# Patient Record
Sex: MALE | Race: WHITE | Employment: UNEMPLOYED | URBAN - METROPOLITAN AREA
[De-identification: names, ages, dates, MRNs, and addresses within clinical notes are randomized per-mention and may not be internally consistent; named-entity substitution may affect disease eponyms.]

---
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
## 2024-12-09 ENCOUNTER — TRANSCRIBE ORDERS (OUTPATIENT)
Dept: OTHER | Age: 24
End: 2024-12-09

## 2024-12-09 DIAGNOSIS — Z98.890 S/P MEDIAL MENISCUS REPAIR OF RIGHT KNEE: Primary | ICD-10-CM

## 2024-12-30 ENCOUNTER — THERAPY VISIT (OUTPATIENT)
Dept: PHYSICAL THERAPY | Facility: CLINIC | Age: 24
End: 2024-12-30
Attending: ORTHOPAEDIC SURGERY
Payer: OTHER MISCELLANEOUS

## 2024-12-30 DIAGNOSIS — Z98.890 S/P MEDIAL MENISCUS REPAIR OF LEFT KNEE: Primary | ICD-10-CM

## 2024-12-30 PROCEDURE — 97161 PT EVAL LOW COMPLEX 20 MIN: CPT | Mod: GP | Performed by: PHYSICAL THERAPIST

## 2024-12-30 PROCEDURE — 97110 THERAPEUTIC EXERCISES: CPT | Mod: GP | Performed by: PHYSICAL THERAPIST

## 2024-12-30 ASSESSMENT — ACTIVITIES OF DAILY LIVING (ADL)
SIT WITH YOUR KNEE BENT: ACTIVITY IS SOMEWHAT DIFFICULT
WALK: ACTIVITY IS MINIMALLY DIFFICULT
AS_A_RESULT_OF_YOUR_KNEE_INJURY,_HOW_WOULD_YOU_RATE_YOUR_CURRENT_LEVEL_OF_DAILY_ACTIVITY?: NEARLY NORMAL
WEAKNESS: THE SYMPTOM AFFECTS MY ACTIVITY SLIGHTLY
KNEE_ACTIVITY_OF_DAILY_LIVING_SUM: 39
STIFFNESS: THE SYMPTOM AFFECTS MY ACTIVITY SLIGHTLY
STAND: ACTIVITY IS MINIMALLY DIFFICULT
LIMPING: THE SYMPTOM AFFECTS MY ACTIVITY SLIGHTLY
KNEEL ON THE FRONT OF YOUR KNEE: ACTIVITY IS VERY DIFFICULT
GO DOWN STAIRS: ACTIVITY IS FAIRLY DIFFICULT
GO UP STAIRS: ACTIVITY IS MINIMALLY DIFFICULT
STAND: ACTIVITY IS MINIMALLY DIFFICULT
HOW_WOULD_YOU_RATE_THE_OVERALL_FUNCTION_OF_YOUR_KNEE_DURING_YOUR_USUAL_DAILY_ACTIVITIES?: ABNORMAL
HOW_WOULD_YOU_RATE_THE_OVERALL_FUNCTION_OF_YOUR_KNEE_DURING_YOUR_USUAL_DAILY_ACTIVITIES?: ABNORMAL
AS_A_RESULT_OF_YOUR_KNEE_INJURY,_HOW_WOULD_YOU_RATE_YOUR_CURRENT_LEVEL_OF_DAILY_ACTIVITY?: NEARLY NORMAL
GIVING WAY, BUCKLING OR SHIFTING OF KNEE: THE SYMPTOM AFFECTS MY ACTIVITY MODERATELY
PLEASE_INDICATE_YOR_PRIMARY_REASON_FOR_REFERRAL_TO_THERAPY:: KNEE
PAIN: THE SYMPTOM AFFECTS MY ACTIVITY SLIGHTLY
GIVING WAY, BUCKLING OR SHIFTING OF KNEE: THE SYMPTOM AFFECTS MY ACTIVITY MODERATELY
WALK: ACTIVITY IS MINIMALLY DIFFICULT
RISE FROM A CHAIR: ACTIVITY IS MINIMALLY DIFFICULT
STIFFNESS: THE SYMPTOM AFFECTS MY ACTIVITY SLIGHTLY
GO UP STAIRS: ACTIVITY IS MINIMALLY DIFFICULT
SQUAT: I AM UNABLE TO DO THE ACTIVITY
SWELLING: THE SYMPTOM AFFECTS MY ACTIVITY SLIGHTLY
WEAKNESS: THE SYMPTOM AFFECTS MY ACTIVITY SLIGHTLY
LIMPING: THE SYMPTOM AFFECTS MY ACTIVITY SLIGHTLY
HOW_WOULD_YOU_RATE_THE_CURRENT_FUNCTION_OF_YOUR_KNEE_DURING_YOUR_USUAL_DAILY_ACTIVITIES_ON_A_SCALE_FROM_0_TO_100_WITH_100_BEING_YOUR_LEVEL_OF_KNEE_FUNCTION_PRIOR_TO_YOUR_INJURY_AND_0_BEING_THE_INABILITY_TO_PERFORM_ANY_OF_YOUR_USUAL_DAILY_ACTIVITIES?: 70
SQUAT: I AM UNABLE TO DO THE ACTIVITY
KNEE_ACTIVITY_OF_DAILY_LIVING_SCORE: 55.71
SIT WITH YOUR KNEE BENT: ACTIVITY IS SOMEWHAT DIFFICULT
GO DOWN STAIRS: ACTIVITY IS FAIRLY DIFFICULT
RAW_SCORE: 39
RISE FROM A CHAIR: ACTIVITY IS MINIMALLY DIFFICULT
PAIN: THE SYMPTOM AFFECTS MY ACTIVITY SLIGHTLY
KNEEL ON THE FRONT OF YOUR KNEE: ACTIVITY IS VERY DIFFICULT
SWELLING: THE SYMPTOM AFFECTS MY ACTIVITY SLIGHTLY
HOW_WOULD_YOU_RATE_THE_CURRENT_FUNCTION_OF_YOUR_KNEE_DURING_YOUR_USUAL_DAILY_ACTIVITIES_ON_A_SCALE_FROM_0_TO_100_WITH_100_BEING_YOUR_LEVEL_OF_KNEE_FUNCTION_PRIOR_TO_YOUR_INJURY_AND_0_BEING_THE_INABILITY_TO_PERFORM_ANY_OF_YOUR_USUAL_DAILY_ACTIVITIES?: 70

## 2024-12-30 NOTE — PROGRESS NOTES
"PHYSICAL THERAPY EVALUATION  Type of Visit: Evaluation       Fall Risk Screen:  Fall screen completed by: PT  Have you fallen 2 or more times in the past year?: No  Have you fallen and had an injury in the past year?: No  Is patient a fall risk?: No  Fall screen comments: skiing falls - without injury    Subjective         Presenting condition or subjective complaint: (Patient-Rptd) torn menicus surgery referred to PT following L meniscal repair.  Onset: was at work and hurried down steep steps hyperextending his knee when he reached the bottom of the stair case. Had ongoing pain for a few weeks and then had MRI showing meniscal tear in 2 places per pt. EXERCISES: Heel slides with 90 degree restriction initially. Reports after last provider meeting he was able to work through \"free range\".   FWB, No brace or crutches at this time.  NWB x 2 months. Forgot to take brace off in Dec as instructed and notes knee feels tight.   Date of onset: 10/07/24 (date of surgery)    Relevant medical history: (Patient-Rptd) Depression; Mental Illness; Sleep disorder like apnea   Dates & types of surgery: (Patient-Rptd) 10/7 had torn menicus surgury    Prior diagnostic imaging/testing results: (Patient-Rptd) MRI; CT scan; X-ray   Per [facility] MR of L knee report:   LEFT KNEE IMPRESSION:   1.  Horizontal longitudinal tear of the left knee medial meniscus   posterior horn extending to the posterior root where there is an   associated 11 mm parameniscal cyst.   2.  No lateral meniscal tear. The cruciate and collateral ligaments   are intact.   3.  No fracture or bony stress injury   Prior therapy history for the same diagnosis, illness or injury: (Patient-Rptd) No      Prior Level of Function  Transfers: Independent  Ambulation: Independent  ADL: Independent  IADL: Finances, Work    Living Environment  Social support: (Patient-Rptd) With family members   Type of home: (Patient-Rptd) House   Stairs to enter the home: (Patient-Rptd) " "Yes       Ramp: (Patient-Rptd) No   Stairs inside the home: (Patient-Rptd) Yes (Patient-Rptd) 3 Is there a railing: (Patient-Rptd) Yes     Help at home: (Patient-Rptd) Self Cares (home health aide/personal care attendant, family, etc)  Equipment owned: (Patient-Rptd) Crutches     Employment: (Patient-Rptd) Yes (Patient-Rptd) [employer]  Hobbies/Interests: (Patient-Rptd) skiing,being outside,being with friends and family    Patient goals for therapy: (Patient-Rptd) walk, climb stairs up or down, skiing, running, lifting/squating    Pain assessment:   No pain, 4-5/10  Walking unless moving knee a \"certain way\", eg knee full extension - sharp. Laying down: R sidelying, sitting x approx 3 hours will get pain with leg movement, rising: weak. Has to be able to lift, squat, would like to get back to downhill skiing.   Knee feels swollen  Numbness: from arch to toes L, improving and occasionally sharp pinch in arch area and around toes     Objective   KNEE EVALUATION    INTEGUMENTARY (edema, incisions):  well healed  POSTURE: Kyphotic in sitting  GAIT:  Weightbearing Status: WBAT  Assistive Device(s): None  Gait Deviations: Antalgic - keeps L knee staight  ROM:   (Degrees) Left AROM Left PROM  Right AROM Right PROM   Knee Flexion 90  131    Knee Extension 7-0  +2    Pain:   End feel:     STRENGTH:   Pain: - none + mild ++ moderate +++ severe  Strength Scale: 0-5/5 Left Right   Knee Flexion 3+ 4   Knee Extension 3+ 4   Quad Set 3- 4     PALPATION: Hypersensitivity along knee joint/incisions L knee    Assessment & Plan   CLINICAL IMPRESSIONS  Medical Diagnosis: S/P medial meniscus repair of right knee (Z98.890)  - Primary    Treatment Diagnosis: S/P medial meniscus repair of right knee (Z98.890)  - Primary   Impression/Assessment: Patient is a 24 year old male with gait level and steps, bending , general activities such as squatting and kneeling, ability to work/recreation complaints.  The following significant findings " have been identified: Pain, Decreased ROM/flexibility, Decreased strength, Impaired balance, Decreased proprioception, Impaired gait, Decreased activity tolerance, and Impaired posture. These impairments interfere with their ability to perform self care tasks, work tasks, recreational activities, household chores, household mobility, and community mobility as compared to previous level of function.     Clinical Decision Making (Complexity):  Clinical Presentation: Stable/Uncomplicated  Clinical Presentation Rationale: based on medical and personal factors listed in PT evaluation  Clinical Decision Making (Complexity): Low complexity    PLAN OF CARE  Treatment Interventions:  Interventions: Manual Therapy, Neuromuscular Re-education, Therapeutic Activity, Therapeutic Exercise, Self-Care/Home Management    Long Term Goals     PT Goal 1  Goal Identifier: AROM  Goal Description: Neri will work on home program to increase L knee ROM to equal that of the R  Rationale: to maximize safety and independence with performance of ADLs and functional tasks;to maximize safety and independence within the home;to maximize safety and independence within the community;to maximize safety and independence with transportation;to maximize safety and independence with self cares  Target Date: 01/10/25  PT Goal 2  Goal Identifier: Strength  Goal Description: Neri will achieve %strength on L leg compared to the R  Rationale: to maximize safety and independence with performance of ADLs and functional tasks;to maximize safety and independence with self cares  Target Date: 01/31/25  PT Goal 3  Goal Identifier: Proprioception  Goal Description: Neri will SLS x 40+ seconds bilaterally allowing him to walk on uneven ground and to improve body awreness  Rationale: to maximize safety and independence within the community;to maximize safety and independence within the home  PT Goal 4  Goal Identifier: functional activities  Goal Description:  Neri will return to work and recreational sports per protocol including lifting, carrying, steps, downhill skiing  Target Date:  (per protocol)      Frequency of Treatment: 2 times per week x 4 weeks and then  1 time per week x 2 weeks  Duration of Treatment: 6 weeks    Recommended Referrals to Other Professionals:  no needs identified at this time  Education Assessment:   Learner/Method: Patient;Listening;Reading;Demonstration;Pictures/Video;No Barriers to Learning  Education Comments: quad and hamstring sets, hip adduction and abduction in sitting, isometrics, heel slides. PLan of care based on protocol, goals    Risks and benefits of evaluation/treatment have been explained.   Patient/Family/caregiver agrees with Plan of Care.     Evaluation Time:  24     No  needed for session.        Signing Clinician: Luann Burnett, PT

## 2024-12-31 PROBLEM — Z98.890 S/P MEDIAL MENISCUS REPAIR OF LEFT KNEE: Status: ACTIVE | Noted: 2024-12-31

## 2025-01-06 ENCOUNTER — THERAPY VISIT (OUTPATIENT)
Dept: PHYSICAL THERAPY | Facility: CLINIC | Age: 25
End: 2025-01-06
Attending: ORTHOPAEDIC SURGERY
Payer: OTHER MISCELLANEOUS

## 2025-01-06 DIAGNOSIS — Z98.890 S/P MEDIAL MENISCUS REPAIR OF LEFT KNEE: Primary | ICD-10-CM

## 2025-01-06 PROCEDURE — 97110 THERAPEUTIC EXERCISES: CPT | Mod: GP | Performed by: PHYSICAL THERAPIST

## 2025-01-13 ENCOUNTER — THERAPY VISIT (OUTPATIENT)
Dept: PHYSICAL THERAPY | Facility: CLINIC | Age: 25
End: 2025-01-13
Attending: ORTHOPAEDIC SURGERY
Payer: OTHER MISCELLANEOUS

## 2025-01-13 DIAGNOSIS — Z98.890 S/P MEDIAL MENISCUS REPAIR OF LEFT KNEE: Primary | ICD-10-CM

## 2025-01-13 PROCEDURE — 97110 THERAPEUTIC EXERCISES: CPT | Mod: GP | Performed by: PHYSICAL THERAPIST

## 2025-01-16 ENCOUNTER — THERAPY VISIT (OUTPATIENT)
Dept: PHYSICAL THERAPY | Facility: CLINIC | Age: 25
End: 2025-01-16
Attending: ORTHOPAEDIC SURGERY
Payer: OTHER MISCELLANEOUS

## 2025-01-16 DIAGNOSIS — Z98.890 S/P MEDIAL MENISCUS REPAIR OF LEFT KNEE: Primary | ICD-10-CM

## 2025-01-16 PROCEDURE — 97110 THERAPEUTIC EXERCISES: CPT | Mod: GP | Performed by: PHYSICAL THERAPIST

## 2025-01-20 ENCOUNTER — THERAPY VISIT (OUTPATIENT)
Dept: PHYSICAL THERAPY | Facility: CLINIC | Age: 25
End: 2025-01-20
Attending: ORTHOPAEDIC SURGERY
Payer: OTHER MISCELLANEOUS

## 2025-01-20 DIAGNOSIS — Z98.890 S/P MEDIAL MENISCUS REPAIR OF LEFT KNEE: Primary | ICD-10-CM

## 2025-01-20 PROCEDURE — 97110 THERAPEUTIC EXERCISES: CPT | Mod: GP | Performed by: PHYSICAL THERAPIST

## 2025-01-23 ENCOUNTER — THERAPY VISIT (OUTPATIENT)
Dept: PHYSICAL THERAPY | Facility: CLINIC | Age: 25
End: 2025-01-23
Attending: ORTHOPAEDIC SURGERY
Payer: OTHER MISCELLANEOUS

## 2025-01-23 DIAGNOSIS — Z98.890 S/P MEDIAL MENISCUS REPAIR OF LEFT KNEE: Primary | ICD-10-CM

## 2025-01-23 PROCEDURE — 97110 THERAPEUTIC EXERCISES: CPT | Mod: GP | Performed by: PHYSICAL THERAPIST

## 2025-01-27 ENCOUNTER — THERAPY VISIT (OUTPATIENT)
Dept: PHYSICAL THERAPY | Facility: CLINIC | Age: 25
End: 2025-01-27
Attending: ORTHOPAEDIC SURGERY
Payer: OTHER MISCELLANEOUS

## 2025-01-27 DIAGNOSIS — Z98.890 S/P MEDIAL MENISCUS REPAIR OF LEFT KNEE: Primary | ICD-10-CM

## 2025-01-27 PROCEDURE — 97110 THERAPEUTIC EXERCISES: CPT | Mod: GP | Performed by: PHYSICAL THERAPIST

## 2025-02-03 ENCOUNTER — THERAPY VISIT (OUTPATIENT)
Dept: PHYSICAL THERAPY | Facility: CLINIC | Age: 25
End: 2025-02-03
Attending: ORTHOPAEDIC SURGERY
Payer: OTHER MISCELLANEOUS

## 2025-02-03 DIAGNOSIS — Z98.890 S/P MEDIAL MENISCUS REPAIR OF LEFT KNEE: Primary | ICD-10-CM

## 2025-02-03 PROCEDURE — 97110 THERAPEUTIC EXERCISES: CPT | Mod: GP | Performed by: PHYSICAL THERAPIST

## 2025-02-03 ASSESSMENT — ACTIVITIES OF DAILY LIVING (ADL)
KNEE_ACTIVITY_OF_DAILY_LIVING_SCORE: 92.86
LIMPING: I DO NOT HAVE THE SYMPTOM
GIVING WAY, BUCKLING OR SHIFTING OF KNEE: I DO NOT HAVE THE SYMPTOM
STIFFNESS: I DO NOT HAVE THE SYMPTOM
SWELLING: I DO NOT HAVE THE SYMPTOM
HOW_WOULD_YOU_RATE_THE_OVERALL_FUNCTION_OF_YOUR_KNEE_DURING_YOUR_USUAL_DAILY_ACTIVITIES?: NEARLY NORMAL
RISE FROM A CHAIR: ACTIVITY IS NOT DIFFICULT
SQUAT: ACTIVITY IS MINIMALLY DIFFICULT
RAW_SCORE: 65
WEAKNESS: I DO NOT HAVE THE SYMPTOM
KNEEL ON THE FRONT OF YOUR KNEE: ACTIVITY IS SOMEWHAT DIFFICULT
KNEE_ACTIVITY_OF_DAILY_LIVING_SUM: 65
GO UP STAIRS: ACTIVITY IS NOT DIFFICULT
GO DOWN STAIRS: ACTIVITY IS MINIMALLY DIFFICULT
WALK: ACTIVITY IS NOT DIFFICULT
HOW_WOULD_YOU_RATE_THE_CURRENT_FUNCTION_OF_YOUR_KNEE_DURING_YOUR_USUAL_DAILY_ACTIVITIES_ON_A_SCALE_FROM_0_TO_100_WITH_100_BEING_YOUR_LEVEL_OF_KNEE_FUNCTION_PRIOR_TO_YOUR_INJURY_AND_0_BEING_THE_INABILITY_TO_PERFORM_ANY_OF_YOUR_USUAL_DAILY_ACTIVITIES?: 90
PAIN: I HAVE THE SYMPTOM BUT IT DOES NOT AFFECT MY ACTIVITY
SIT WITH YOUR KNEE BENT: ACTIVITY IS NOT DIFFICULT
AS_A_RESULT_OF_YOUR_KNEE_INJURY,_HOW_WOULD_YOU_RATE_YOUR_CURRENT_LEVEL_OF_DAILY_ACTIVITY?: ABNORMAL
STAND: ACTIVITY IS NOT DIFFICULT

## 2025-02-03 NOTE — PROGRESS NOTES
02/03/25 0500   Appointment Info   Signing clinician's name / credentials Luann Burnett PT   Total/Authorized Visits 12 requested, 1 approved -workcomp   Visits Used 8   Medical Diagnosis S/P medial meniscus repair of right knee (Z98.890)  - Primary   PT Tx Diagnosis S/P medial meniscus repair of  Left knee (Z98.890)  - Primary   Precautions/Limitations follow [facility] protocol, no more than 90 degrees flexion   Other pertinent information Provider would like 1-2 times per week x 4-6 weeks   Progress Note/Certification   Onset of illness/injury or Date of Surgery 10/07/24  (date of surgery)   Therapy Frequency 2 times per week x 4 weeks and then  1 time per week x 2 weeks   Predicted Duration 6 weeks   Progress Note Due Date 02/10/25   Progress Note Completed Date 12/30/24       Present No   GOALS   PT Goals 2;3;4   PT Goal 1   Goal Identifier AROM   Goal Description Neri will work on home program to increase L knee ROM to equal that of the R   Rationale to maximize safety and independence with performance of ADLs and functional tasks;to maximize safety and independence within the home;to maximize safety and independence within the community;to maximize safety and independence with transportation;to maximize safety and independence with self cares   Goal Progress progressing - see below. Expect full knee flexion next week   Target Date 01/31/25   PT Goal 2   Goal Identifier Strength   Goal Description Neri will achieve %strength on L leg compared to the R   Rationale to maximize safety and independence with performance of ADLs and functional tasks;to maximize safety and independence with self cares   Goal Progress improved with endurance   Target Date 01/31/25   PT Goal 3   Goal Identifier Proprioception   Goal Description Neri will SLS x 40+ seconds bilaterally allowing him to walk on uneven ground and to improve body awareness   Rationale to maximize safety and independence within the  "community;to maximize safety and independence within the home   Goal Progress poor plus L leg tandem stand today vs fair R   Target Date 02/20/25   PT Goal 4   Goal Identifier functional activities   Goal Description Neri will return to work and recreational sports per protocol including lifting, carrying, steps, downhill skiing   Target Date   (per protocol)   Subjective Report   Subjective Report 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 COmpleting exercises at least 1 time per day and sometimes 2 times per day. Does have appt with surgeon this week.   Objective Measures   Objective Measures Objective Measure 4   Objective Measure 1   Objective Measure Symptoms   Details .5-1/10 \"almost non existent\". Mainly pain with sidelying on R at night for sleeping without pillow between knees for support as he understood he was not supposed to.   Objective Measure 2   Objective Measure KOS   Details 2-3-2025: [id number] [id number]-: 39-14-39-55.71   Objective Measure 3   Objective Measure AROM (supine initial eval: 7-0 to 90 degrees L and +2 to 131 degrees R)   Details +1 to 131 degrees - L knee   Objective Measure 4   Objective Measure MRROM   Details hip resisted: 3/5 R, L: 4/5; ADDuction: R: 3/5.  L: 3/5        ABDuction: R: 3/5 L: 4-/5; extension: R: 3/5; L: 4-/5;   Therapeutic Procedure/Exercise   Therapeutic Procedures: strength, endurance, ROM, flexibility minutes ([id number]) 40   Ther Proc 1 - Details Completed recheck. Side stepping crouch 1 x 2:26 minutes x 1 bilateraly (to fatigue), 3 minutes with sport's cord forward and back requiring cues to use core and move with normal pattern, Heel lifts with sports cord from behind and front 1 x 10 each direction. Strongly encouraged to complete strengthening exercises to fatigue and work on isolating muscles. Complete exercises bilaterally. REcommended he treat his exercises like his job working out at " start and end of work time.   Skilled Intervention Advanced strengthening. Encouraged to workout for strength and endurance   Patient Response/Progress no issues. Noted how weak he was with manual resistance   Education   Learner/Method Patient;Listening;No Barriers to Learning   Education Comments push strengthening and be active   Plan   Home program Can decrease these to 2-3 days per week (adding SLRs x 4): quad and hamstring sets, hip adduction and abduction in sitting, isometrics.  heel slides, Hip diagonals with band - 45 degrees forwad and back bilaterally, mini squats, heel lifts. Can hold hip abduction and adduction isometrics, SLS, lateral steps ups   Plan for next session Check surgeon recommendations, work hard on strength - functional - lunges with reisistance multiple directions, lift and carry, hamstring curls, leg press single leg   Comments   Comments Very poor strength. AROM improving. Pain minimal and mainly when he pushed through activity.   Total Session Time   Timed Code Treatment Minutes 40   Total Treatment Time (sum of timed and untimed services) 40

## 2025-02-11 ENCOUNTER — THERAPY VISIT (OUTPATIENT)
Dept: PHYSICAL THERAPY | Facility: CLINIC | Age: 25
End: 2025-02-11
Attending: ORTHOPAEDIC SURGERY
Payer: OTHER MISCELLANEOUS

## 2025-02-11 DIAGNOSIS — Z98.890 S/P MEDIAL MENISCUS REPAIR OF LEFT KNEE: Primary | ICD-10-CM

## 2025-02-11 PROCEDURE — 97110 THERAPEUTIC EXERCISES: CPT | Mod: GP | Performed by: PHYSICAL THERAPIST

## 2025-02-24 ENCOUNTER — THERAPY VISIT (OUTPATIENT)
Dept: PHYSICAL THERAPY | Facility: CLINIC | Age: 25
End: 2025-02-24
Attending: ORTHOPAEDIC SURGERY
Payer: OTHER MISCELLANEOUS

## 2025-02-24 DIAGNOSIS — Z98.890 S/P MEDIAL MENISCUS REPAIR OF LEFT KNEE: Primary | ICD-10-CM

## 2025-02-24 PROCEDURE — 97112 NEUROMUSCULAR REEDUCATION: CPT | Mod: GP | Performed by: PHYSICAL THERAPIST

## 2025-02-24 PROCEDURE — 97110 THERAPEUTIC EXERCISES: CPT | Mod: GP | Performed by: PHYSICAL THERAPIST

## 2025-05-13 PROBLEM — Z98.890 S/P MEDIAL MENISCUS REPAIR OF LEFT KNEE: Status: RESOLVED | Noted: 2024-12-31 | Resolved: 2025-05-13

## 2025-06-10 ENCOUNTER — HOSPITAL ENCOUNTER (EMERGENCY)
Facility: CLINIC | Age: 25
Discharge: HOME | End: 2025-06-10
Attending: EMERGENCY MEDICINE | Admitting: EMERGENCY MEDICINE

## 2025-06-10 VITALS
OXYGEN SATURATION: 97 % | WEIGHT: 172 LBS | HEART RATE: 76 BPM | HEIGHT: 71 IN | TEMPERATURE: 98.8 F | BODY MASS INDEX: 24.08 KG/M2 | DIASTOLIC BLOOD PRESSURE: 70 MMHG | RESPIRATION RATE: 16 BRPM | SYSTOLIC BLOOD PRESSURE: 131 MMHG

## 2025-06-10 DIAGNOSIS — S61.412A LACERATION OF LEFT HAND WITHOUT FOREIGN BODY, INITIAL ENCOUNTER: ICD-10-CM

## 2025-06-10 PROCEDURE — 12002 RPR S/N/AX/GEN/TRNK2.6-7.5CM: CPT | Performed by: EMERGENCY MEDICINE

## 2025-06-10 PROCEDURE — 99282 EMERGENCY DEPT VISIT SF MDM: CPT | Mod: 25 | Performed by: EMERGENCY MEDICINE

## 2025-06-10 ASSESSMENT — COLUMBIA-SUICIDE SEVERITY RATING SCALE - C-SSRS
1. IN THE PAST MONTH, HAVE YOU WISHED YOU WERE DEAD OR WISHED YOU COULD GO TO SLEEP AND NOT WAKE UP?: NO
6. HAVE YOU EVER DONE ANYTHING, STARTED TO DO ANYTHING, OR PREPARED TO DO ANYTHING TO END YOUR LIFE?: NO
2. HAVE YOU ACTUALLY HAD ANY THOUGHTS OF KILLING YOURSELF IN THE PAST MONTH?: NO

## 2025-06-10 NOTE — DISCHARGE INSTRUCTIONS
The abrasion/laceration you sustained today was not very deep and should heal nicely on its own.  Some glue and Steri-Strips were used to help approximate the wound edges and prevent it from slipping open further.  The Steri-Strips will start to peel up and can be taken off when they become bothersome.  The glue will start to flake off and dissolve on its own.    It is okay to wash your hands for hand hygiene or shower or bathe as usual.  Just pat dry carefully over this area.  May need to keep covered while at work    Do not apply antibiotic ointment since this can dissolve the glue    You declined the tetanus vaccination today, but it does look like yours is up-to-date, was last given in 2020, so you should be covered    If you develop any redness, streaking up your arm, pus draining from the wound, fever, or any new concerns, do not hesitate to return to the emergency room for evaluation

## 2025-06-10 NOTE — Clinical Note
Neri Clemens was seen and treated in our emergency department on 6/10/2025.         Sincerely,     [facility] Emergency Dept

## 2025-06-10 NOTE — ED TRIAGE NOTES
Patient reports work related injury to left thumb/palm area. He was using a scraper and scraper slipped and cut his skin.     Triage Assessment (Adult)       Row Name 06/10/25 0731          Respiratory WDL    Respiratory WDL WDL        Skin Circulation/Temperature WDL    Skin Circulation/Temperature WDL --  left thumb lac

## 2025-06-10 NOTE — ED PROVIDER NOTES
"  History     Chief Complaint   Patient presents with    Laceration     HPI  Neri Clemens is a 24 year old male who presents with concern of hand laceration.  He works at [employer] and scraped his left hand with a putty knife cutting his skin.  Bleeding is controlled.  Unsure of his last tetanus vaccine.  He is right-hand dominant.    Allergies:  Allergies   Allergen Reactions    Pcn [Penicillins] Rash       Problem List:    There are no active problems to display for this patient.       Past Medical History:    History reviewed. No pertinent past medical history.    Past Surgical History:    Past Surgical History:   Procedure Laterality Date    LAPAROSCOPIC APPENDECTOMY N/A 10/10/2019    Procedure: APPENDECTOMY, LAPAROSCOPIC;  Surgeon: Crow Linda DO;  Location: [facility] OR       Family History:    No family history on file.    Social History:  Marital Status:  Single [1]  Social History     Tobacco Use    Smoking status: Never    Smokeless tobacco: Never   Substance Use Topics    Alcohol use: Never    Drug use: Never        Medications:    No current outpatient medications on file.        Review of Systems   All other systems reviewed and are negative.      Physical Exam   BP: 131/70  Pulse: 76  Temp: 98.8  F (37.1  C)  Resp: 16  Height: 180.3 cm (5' 11\")  Weight: 78 kg (172 lb)  SpO2: 97 %      Physical Exam  Vitals and nursing note reviewed.   Constitutional:       General: He is not in acute distress.     Appearance: He is not toxic-appearing.   Cardiovascular:      Pulses:           Radial pulses are 2+ on the right side and 2+ on the left side.   Musculoskeletal:        Hands:    Skin:     General: Skin is warm.      Capillary Refill: Capillary refill takes less than 2 seconds.   Neurological:      Mental Status: He is alert.         ED [facility]    -Laceration Repair    Date/Time: 6/10/2025 7:38 AM    Performed by: Susan Ferrera, " DO  Authorized by: Susan Ferrera DO    Risks, benefits and alternatives discussed.      ANESTHESIA (see MAR for exact dosages):     Anesthesia method:  None  LACERATION DETAILS     Location:  Hand    Hand location: Left thenar eminence.    Length (cm):  4.5    Depth (mm):  2    REPAIR TYPE:     Repair type:  Simple      TREATMENT:     Area cleansed with:  Soap and water    SKIN REPAIR     Repair method:  Tissue adhesive and Steri-Strips    Number of Steri-Strips:  5    APPROXIMATION     Approximation:  Close    POST-PROCEDURE DETAILS     Dressing:  Non-adherent dressing      PROCEDURE    Patient Tolerance:  Patient tolerated the procedure well with no immediate complications                Critical Care time:  none              No results found for this or any previous visit (from the past 24 hours).    Medications - No data to display    Assessments & Plan (with Medical Decision Making)  Neri is a 24-year-old male presenting with hand laceration from work injury.  See history and physical exam as above  24-year-old male in no acute distress, vitally stable and afebrile.  Has normal cap refill and strong radial pulses bilaterally.  Very superficial linear laceration, no bleeding, no gaping wound edges.  Though it is long length, very minimal in depth.  Can be repaired with tissue adhesive, mostly for protection, and will also use Steri-Strips.  Told patient I was going to check on his tetanus status and will update if needed, but he declined and says that he does not want a tetanus vaccine.  We discussed the risks of raf tetanus and he still declines.  Wound was cleansed and repaired in fashion above.  Tolerated this well.  He was given a note for proof of visits but told that he may return to work.  Discussed wound care and return precautions.  Discharged in stable condition     I have reviewed the nursing notes.    I have reviewed the findings, diagnosis, plan and need for follow up with the  patient.           Medical Decision Making  The patient's presentation was of low complexity (an acute and uncomplicated illness or injury).    The patient's evaluation involved:  history and exam without other MDM data elements    The patient's management necessitated moderate risk (a decision regarding minor procedure (laceration repair) with risk factors of none).        There are no discharge medications for this patient.      Final diagnoses:   Laceration of left hand without foreign body, initial encounter       6/10/2025   [facility] EMERGENCY DEPT       Susan Ferrera DO  06/10/25 0757

## (undated) DEVICE — SOL NACL 0.9% INJ 1000ML BAG 07983-09

## (undated) DEVICE — GLOVE PROTEXIS BLUE W/NEU-THERA 8.0  2D73EB80

## (undated) DEVICE — ENDO TROCAR SLEEVE KII Z-THREADED 05X100MM CTS02

## (undated) DEVICE — SU MONOCRYL 4-0 PS-2 18" UND Y496G

## (undated) DEVICE — ENDO POUCH GOLD 10MM ECATCH 173050G

## (undated) DEVICE — ESU GROUND PAD UNIVERSAL W/O CORD

## (undated) DEVICE — BLADE CLIPPER 4406

## (undated) DEVICE — DEVICE SUTURE GRASPER TROCAR CLOSURE 14GA PMITCSG

## (undated) DEVICE — STPL POWERED ECHELON 45MM PSEE45A

## (undated) DEVICE — GLOVE ESTEEM BLUE W/NEU-THERA 8.0  2D73PB80

## (undated) DEVICE — ENDO TROCAR FIRST ENTRY KII FIOS Z-THRD 05X100MM CTF03

## (undated) DEVICE — GLOVE PROTEXIS W/NEU-THERA 7.5  2D73TE75

## (undated) DEVICE — ADH SKIN CLOSURE PREMIERPRO EXOFIN 1.0ML 3470

## (undated) DEVICE — Device

## (undated) DEVICE — PACK GENERAL LAPAOSCOPY

## (undated) DEVICE — ENDO TROCAR FIRST ENTRY KII FIOS Z-THRD 12X100MM CTF73

## (undated) DEVICE — STPL RELOAD REG TISSUE ECHELON 45 X 3.6MM BLUE GST45B

## (undated) DEVICE — NDL INSUFFLATION 13GA 120MM C2201

## (undated) RX ORDER — BUPIVACAINE HYDROCHLORIDE AND EPINEPHRINE 2.5; 5 MG/ML; UG/ML
INJECTION, SOLUTION EPIDURAL; INFILTRATION; INTRACAUDAL; PERINEURAL
Status: DISPENSED
Start: 2019-10-10

## (undated) RX ORDER — ONDANSETRON 2 MG/ML
INJECTION INTRAMUSCULAR; INTRAVENOUS
Status: DISPENSED
Start: 2019-10-10

## (undated) RX ORDER — LIDOCAINE HYDROCHLORIDE 20 MG/ML
INJECTION, SOLUTION EPIDURAL; INFILTRATION; INTRACAUDAL; PERINEURAL
Status: DISPENSED
Start: 2019-10-10

## (undated) RX ORDER — DEXAMETHASONE SODIUM PHOSPHATE 10 MG/ML
INJECTION, SOLUTION INTRAMUSCULAR; INTRAVENOUS
Status: DISPENSED
Start: 2019-10-10

## (undated) RX ORDER — FENTANYL CITRATE 50 UG/ML
INJECTION, SOLUTION INTRAMUSCULAR; INTRAVENOUS
Status: DISPENSED
Start: 2019-10-10

## (undated) RX ORDER — PROPOFOL 10 MG/ML
INJECTION, EMULSION INTRAVENOUS
Status: DISPENSED
Start: 2019-10-10